# Patient Record
Sex: MALE | Race: WHITE | NOT HISPANIC OR LATINO | Employment: OTHER | URBAN - METROPOLITAN AREA
[De-identification: names, ages, dates, MRNs, and addresses within clinical notes are randomized per-mention and may not be internally consistent; named-entity substitution may affect disease eponyms.]

---

## 2018-12-31 ENCOUNTER — OFFICE VISIT (OUTPATIENT)
Dept: URGENT CARE | Facility: CLINIC | Age: 68
End: 2018-12-31
Payer: MEDICARE

## 2018-12-31 VITALS
SYSTOLIC BLOOD PRESSURE: 158 MMHG | BODY MASS INDEX: 43.99 KG/M2 | TEMPERATURE: 99.6 F | HEART RATE: 73 BPM | RESPIRATION RATE: 20 BRPM | DIASTOLIC BLOOD PRESSURE: 72 MMHG | OXYGEN SATURATION: 96 % | HEIGHT: 69 IN | WEIGHT: 297 LBS

## 2018-12-31 DIAGNOSIS — L08.9 WOUND INFECTION: Primary | ICD-10-CM
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
## 2018-12-31 DIAGNOSIS — S61.452A DOG BITE, HAND, LEFT, INITIAL ENCOUNTER: ICD-10-CM

## 2018-12-31 DIAGNOSIS — T14.8XXA WOUND INFECTION: Primary | ICD-10-CM

## 2018-12-31 PROCEDURE — 99203 OFFICE O/P NEW LOW 30 MIN: CPT | Performed by: EMERGENCY MEDICINE

## 2018-12-31 PROCEDURE — 90715 TDAP VACCINE 7 YRS/> IM: CPT

## 2018-12-31 PROCEDURE — G0463 HOSPITAL OUTPT CLINIC VISIT: HCPCS | Performed by: EMERGENCY MEDICINE

## 2018-12-31 RX ORDER — ATORVASTATIN CALCIUM 10 MG/1
20 TABLET, FILM COATED ORAL DAILY
COMMUNITY

## 2018-12-31 RX ORDER — CITALOPRAM 40 MG/1
40 TABLET ORAL DAILY
COMMUNITY

## 2018-12-31 RX ORDER — AMOXICILLIN AND CLAVULANATE POTASSIUM 875; 125 MG/1; MG/1
1 TABLET, FILM COATED ORAL EVERY 12 HOURS SCHEDULED
Qty: 14 TABLET | Refills: 0 | Status: SHIPPED | OUTPATIENT
Start: 2018-12-31 | End: 2019-01-07

## 2018-12-31 NOTE — PROGRESS NOTES
[id number]        NAME: Haydee Colón is a 80 y o  male  : 1950    MRN: [id number]  DATE: 2018  TIME: 2:25 PM    Assessment and Plan   Wound infection [T14  8XXA, L08 9]  1  Wound infection  amoxicillin-clavulanate (AUGMENTIN) 875-125 mg per tablet   2  Dog bite, hand, left, initial encounter  TDAP Vaccine greater than or equal to 6yo    amoxicillin-clavulanate (AUGMENTIN) 875-125 mg per tablet         Patient Instructions     There are no Patient Instructions on file for this visit  Follow up with PCP in 3-5 days  Proceed to  ER if symptoms worsen  Chief Complaint     Chief Complaint   Patient presents with    Animal Bite     dog bite 6 days ago Left hand          History of Present Illness       Patient complains increasing redness swelling and warmth left hand dorsum since dog bite to same 7 days ago  Patient claims he was walking his dog when another dog try to fight with his dog and patient got between the 2 dogs  He noted the dog did have a collar with a license and the rabies vaccination tag  After he was bitten feel the dog ran away  Patient denies fever chills  He does not recall when he had his last tetanus shot but thinks it was many years ago  Review of Systems   Review of Systems   Constitutional: Negative for chills and fever  Musculoskeletal: Negative for arthralgias and joint swelling  Skin: Positive for color change and wound  Negative for rash  Neurological: Negative for numbness           Current Medications       Current Outpatient Prescriptions:     atorvastatin (LIPITOR) 10 mg tablet, Take 20 mg by mouth daily, Disp: , Rfl:     citalopram (CeleXA) 40 mg tablet, Take 40 mg by mouth daily, Disp: , Rfl:     amoxicillin-clavulanate (AUGMENTIN) 875-125 mg per tablet, Take 1 tablet by mouth every 12 (twelve) hours for 7 days, Disp: 14 tablet, Rfl: 0    Current Allergies     Allergies as of 2018    (No Known Allergies)            The following portions of the patient's history were reviewed and updated as appropriate: allergies, current medications, past family history, past medical history, past social history, past surgical history and problem list      Past Medical History:   Diagnosis Date    Hypertension        Past Surgical History:   Procedure Laterality Date    NO PAST SURGERIES         Family History   Problem Relation Age of Onset    Heart attack Mother          Medications have been verified  Objective   /72   Pulse 73   Temp 99 6 °F (37 6 °C)   Resp 20   Ht 5' 9" (1 753 m)   Wt 135 kg (297 lb)   SpO2 96%   BMI 43 86 kg/m²        Physical Exam     Physical Exam   Constitutional: He is oriented to person, place, and time  He appears well-developed and well-nourished  No distress  Neck: Neck supple  Cardiovascular: Normal rate and regular rhythm  Pulmonary/Chest: Effort normal and breath sounds normal    Musculoskeletal: He exhibits no tenderness  Neurological: He is alert and oriented to person, place, and time  Skin: Skin is warm and dry  No rash noted  There is erythema  Puncture wounds left hand dorsum with moderate erythema with increased warmth over the entire dorsal hand  No visible or palpable foreign body  No red streak extending proximally  Nursing note and vitals reviewed